# Patient Record
Sex: MALE
[De-identification: names, ages, dates, MRNs, and addresses within clinical notes are randomized per-mention and may not be internally consistent; named-entity substitution may affect disease eponyms.]

---

## 2018-10-11 ENCOUNTER — HOSPITAL ENCOUNTER (EMERGENCY)
Dept: HOSPITAL 5 - ED | Age: 58
Discharge: OTHER INSTITUTION | End: 2018-10-11
Payer: COMMERCIAL

## 2018-10-11 VITALS — DIASTOLIC BLOOD PRESSURE: 84 MMHG | SYSTOLIC BLOOD PRESSURE: 128 MMHG

## 2018-10-11 DIAGNOSIS — I21.A1: ICD-10-CM

## 2018-10-11 DIAGNOSIS — I10: ICD-10-CM

## 2018-10-11 DIAGNOSIS — R07.89: Primary | ICD-10-CM

## 2018-10-11 DIAGNOSIS — N28.9: ICD-10-CM

## 2018-10-11 DIAGNOSIS — Z87.891: ICD-10-CM

## 2018-10-11 DIAGNOSIS — N17.9: ICD-10-CM

## 2018-10-11 DIAGNOSIS — R06.02: ICD-10-CM

## 2018-10-11 DIAGNOSIS — I48.91: ICD-10-CM

## 2018-10-11 DIAGNOSIS — Z91.09: ICD-10-CM

## 2018-10-11 LAB
APTT BLD: 29.2 SEC. (ref 24.2–36.6)
BASOPHILS # (AUTO): 0.1 K/MM3 (ref 0–0.1)
BASOPHILS NFR BLD AUTO: 0.8 % (ref 0–1.8)
BUN SERPL-MCNC: 41 MG/DL (ref 9–20)
BUN/CREAT SERPL: 13 %
CALCIUM SERPL-MCNC: 7.9 MG/DL (ref 8.4–10.2)
EOSINOPHIL # BLD AUTO: 0.2 K/MM3 (ref 0–0.4)
EOSINOPHIL NFR BLD AUTO: 2.8 % (ref 0–4.3)
HCT VFR BLD CALC: 31.7 % (ref 35.5–45.6)
HDLC SERPL-MCNC: 41 MG/DL (ref 40–59)
HEMOLYSIS INDEX: 9
HGB BLD-MCNC: 10.4 GM/DL (ref 11.8–15.2)
INR PPP: 1.05 (ref 0.87–1.13)
LYMPHOCYTES # BLD AUTO: 1.4 K/MM3 (ref 1.2–5.4)
LYMPHOCYTES NFR BLD AUTO: 19.4 % (ref 13.4–35)
MCH RBC QN AUTO: 34 PG (ref 28–32)
MCHC RBC AUTO-ENTMCNC: 33 % (ref 32–34)
MCV RBC AUTO: 103 FL (ref 84–94)
MONOCYTES # (AUTO): 0.8 K/MM3 (ref 0–0.8)
MONOCYTES % (AUTO): 10.3 % (ref 0–7.3)
PLATELET # BLD: 302 K/MM3 (ref 140–440)
RBC # BLD AUTO: 3.09 M/MM3 (ref 3.65–5.03)

## 2018-10-11 PROCEDURE — 85730 THROMBOPLASTIN TIME PARTIAL: CPT

## 2018-10-11 PROCEDURE — 82553 CREATINE MB FRACTION: CPT

## 2018-10-11 PROCEDURE — 80061 LIPID PANEL: CPT

## 2018-10-11 PROCEDURE — 99291 CRITICAL CARE FIRST HOUR: CPT

## 2018-10-11 PROCEDURE — 93005 ELECTROCARDIOGRAM TRACING: CPT

## 2018-10-11 PROCEDURE — A9558 XE133 XENON 10MCI: HCPCS

## 2018-10-11 PROCEDURE — A9540 TC99M MAA: HCPCS

## 2018-10-11 PROCEDURE — 85379 FIBRIN DEGRADATION QUANT: CPT

## 2018-10-11 PROCEDURE — 78582 LUNG VENTILAT&PERFUS IMAGING: CPT

## 2018-10-11 PROCEDURE — 96374 THER/PROPH/DIAG INJ IV PUSH: CPT

## 2018-10-11 PROCEDURE — 36415 COLL VENOUS BLD VENIPUNCTURE: CPT

## 2018-10-11 PROCEDURE — 80048 BASIC METABOLIC PNL TOTAL CA: CPT

## 2018-10-11 PROCEDURE — 85025 COMPLETE CBC W/AUTO DIFF WBC: CPT

## 2018-10-11 PROCEDURE — 85610 PROTHROMBIN TIME: CPT

## 2018-10-11 PROCEDURE — 93010 ELECTROCARDIOGRAM REPORT: CPT

## 2018-10-11 PROCEDURE — 84484 ASSAY OF TROPONIN QUANT: CPT

## 2018-10-11 PROCEDURE — 82550 ASSAY OF CK (CPK): CPT

## 2018-10-11 PROCEDURE — 83880 ASSAY OF NATRIURETIC PEPTIDE: CPT

## 2018-10-11 PROCEDURE — 71045 X-RAY EXAM CHEST 1 VIEW: CPT

## 2018-10-11 NOTE — NUCLEAR MEDICINE REPORT
VENTILATION/PERFUSION LUNG SCAN: 10/11/18 



CLINICAL: Chest pain and shortness of breath.



TECHNIQUE: 15.0 mCi of xenon-133 was administered by aerosol and 5.0 

mCi of technetium 99m MAA was administered intravenously.  No chest 

x-ray for comparison.



FINDINGS:    



   

Inhalation of Xenon gas demonstrates a normal distribution of the  

activity throughout both lungs.  However, wash out phases show 

significant retention of activity in the left upper lobe and left lower 

lobe.



After injection of Technetium 99m macroaggregated albumin gamma  

camera imaging of the lungs in multiple projections demonstrates  

normal pulmonary contours with a relatively homogeneous distribution of 

activity in the right lung.  Heterogeneous perfusion in the left lung 

matches areas of air trapping on the ventilation study.  



IMPRESSION:  

Low probability for pulmonary embolus.

## 2018-10-11 NOTE — EMERGENCY DEPARTMENT REPORT
ED Chest Pain HPI





- General


Chief Complaint: Chest Pain


Stated Complaint: CHEST PAIN


Time Seen by Provider: 10/11/18 10:20


Source: patient, EMS


Mode of arrival: Stretcher


Limitations: Physical Limitation





- History of Present Illness


Initial Comments: 


Patient is a 58-year-old male sensed emergency room via EMS for chest pain and 

shortness of breath and palpitations.  Patient states that his chest pain 

palpitations and shortness of breath have resolved after vagal maneuver done by 

EMS.  Per EMS patient was diaphoretic and having dyspnea and on EKG was found 

to have a tachycardic, SVT rhythm and on scene EMS performed a vagal maneuver 

and the patient's rhythm converted.  Patient states after the vagal maneuver 

all symptoms resolved.  Patient denies fever and chills patient denies 

abdominal pain.  Patient denies nausea vomiting.  Patient states he does have a 

history of A. fib but it has been controlled for a long time.


MD Complaint: chest pain


-: Sudden


Onset: during rest


Pain Location: substernal, left chest


Pain Radiation: LUE


Severity: severe


Severity scale (0 -10): 6


Quality: heaviness


Consistency: now resolved


Improves With: rest, other (EMS intervention)


Worsens With: exertion


re: diaphoresis, dyspnea.  denies: nausea, vomting, sense of impending doom


Other Symptoms: palpitations.  denies: cough, fever, syncope, rash, acid taste 

in mouth, leg swelling, burping


Treatments Prior to Arrival: other (EKG and vagal maneuver)


Aspirin use within the Past 7 Days: (1) Yes





- Related Data


On Oral Contraceptives: No


 Allergies











Allergy/AdvReac Type Severity Reaction Status Date / Time


 


iron Allergy  Nausea Verified 10/11/18 10:37














Heart Score





- HEART Score


History: Moderately suspicious


EKG: Non-specific


Age: 45-65


Risk factors: 1-2 risk factors


Troponin: > 3x normal limit


HEART Score: 6





ED Review of Systems


ROS: 


Stated complaint: CHEST PAIN


Other details as noted in HPI





Constitutional: diaphoresis.  denies: chills, fever


Eyes: denies: eye pain, eye discharge, vision change


ENT: denies: ear pain, throat pain


Respiratory: shortness of breath.  denies: cough, wheezing


Cardiovascular: chest pain, palpitations


Endocrine: no symptoms reported


Gastrointestinal: denies: abdominal pain, nausea, diarrhea


Genitourinary: denies: urgency, dysuria


Musculoskeletal: denies: back pain, joint swelling, arthralgia


Skin: denies: rash, lesions


Neurological: denies: headache, weakness, paresthesias


Psychiatric: denies: anxiety, depression


Hematological/Lymphatic: denies: easy bleeding, easy bruising





ED Past Medical Hx





- Past Medical History


Previous Medical History?: Yes


Hx Hypertension: Yes





- Social History


Smoking Status: Former Smoker


Substance Use Type: None





ED Physical Exam





- General


Limitations: Physical Limitation


General appearance: alert, in no apparent distress





- Head


Head exam: Present: atraumatic, normocephalic





- Eye


Eye exam: Present: normal appearance





- ENT


ENT exam: Present: mucous membranes moist





- Neck


Neck exam: Present: normal inspection





- Respiratory


Respiratory exam: Present: normal lung sounds bilaterally.  Absent: respiratory 

distress





- Cardiovascular


Cardiovascular Exam: Present: regular rate, normal rhythm.  Absent: systolic 

murmur, diastolic murmur, rubs, gallop





- GI/Abdominal


GI/Abdominal exam: Present: soft, normal bowel sounds





- Rectal


Rectal exam: Present: deferred





- Extremities Exam


Extremities exam: Present: normal inspection





- Back Exam


Back exam: Present: normal inspection





- Neurological Exam


Neurological exam: Present: alert, oriented X3





- Psychiatric


Psychiatric exam: Present: normal affect, normal mood





- Skin


Skin exam: Present: warm, dry, intact, normal color.  Absent: rash





ED Course


 Vital Signs











  10/11/18 10/11/18 10/11/18





  10:27 10:33 10:45


 


Temperature 97.6 F  


 


Pulse Rate 77  103 H


 


Respiratory 18 10 L 11 L





Rate   


 


Blood Pressure 130/85  136/89


 


O2 Sat by Pulse 99  73 L





Oximetry   














  10/11/18 10/11/18 10/11/18





  11:00 11:01 11:15


 


Temperature   


 


Pulse Rate 100 H  99 H


 


Respiratory 14 18 12





Rate   


 


Blood Pressure 140/88  142/96


 


O2 Sat by Pulse 98 99 99





Oximetry   














  10/11/18 10/11/18 10/11/18





  11:30 11:45 12:00


 


Temperature   


 


Pulse Rate 97 H 73 70


 


Respiratory 19 14 15





Rate   


 


Blood Pressure 142/96 140/86 141/81


 


O2 Sat by Pulse 100 80 L 75 L





Oximetry   














  10/11/18 10/11/18 10/11/18





  12:15 12:30 12:45


 


Temperature   


 


Pulse Rate 69 71 71


 


Respiratory 13 13 14





Rate   


 


Blood Pressure 140/82 141/82 132/80


 


O2 Sat by Pulse 98 100 98





Oximetry   














  10/11/18 10/11/18 10/11/18





  13:00 13:16 13:30


 


Temperature   


 


Pulse Rate 94 H 68 69


 


Respiratory 17 18 12





Rate   


 


Blood Pressure 141/100 143/92 135/84


 


O2 Sat by Pulse 97 97 99





Oximetry   














  10/11/18 10/11/18 10/11/18





  13:45 14:00 14:15


 


Temperature   


 


Pulse Rate 68 71 


 


Respiratory 13 14 





Rate   


 


Blood Pressure 136/77 136/81 138/80


 


O2 Sat by Pulse 99 97 96





Oximetry   














  10/11/18





  18:28


 


Temperature 


 


Pulse Rate 109 H


 


Respiratory 





Rate 


 


Blood Pressure 128/84


 


O2 Sat by Pulse 92





Oximetry 














- Reevaluation(s)


Reevaluation #1: 








Discuss results with patient.  Patient's troponin is elevated most likely 

secondary to A. fib with RVR.  Patient's heart rate has been controlled on the 

ER.  Patient's vital signs are stable.  Patient also has elevated d-dimer.  

Patient is in acute renal failure and will need a nuclear scan to rule out PE


10/11/18 12:36





Reevaluation #2: 


Discussed all results with patient.  Discussed plan of care and admission with 

patient.  Patient refused admission and wants to sign out AMA.  Patient states 

he wants to see his cardiologist as an outpatient.  I encouraged patient to be 

admitted here and explained all the risk of leaving the hospital AGAINST 

MEDICAL ADVICE.  Patient voiced understanding of risks.  .  Nurse at bedside 

for entire conversation.


10/11/18 15:33








After return to the room to have the patient sign an AMA form, patient states 

she would rather be transferred to [facility] then leave AGAINST 

MEDICAL ADVICE.  I will consult with [facility] ER to see if they 

will accept.


10/11/18 15:45








Patient's repeat troponin came back elevated again, will place patient on 

heparin drip.  Receiving physician at [facility] has been made aware 

of changes and new treatment plan.  Due to the fact that the patient's troponin 

is elevating, elevation could be secondary to renal or could be an N STEMI, but 

etiology unclear so we will go ahead and start heparin drip.


10/11/18 17:34








Patient started on heparin drip.  Patient is being drip via EMS to [facility].  Patient is stable to time.  EMS will transfer patient


10/11/18 18:47








- Consultations


Consultation #1: 


[facility] attending Dr. NORA Anglin  has accepted the patient as a direct 

admit.  Patient will be transferred via EMS to [facility]








10/11/18 16:25








CHAITANYA score





- Chaitanya Score


Age > 65: (0) No


Aspirin use within the Past 7 Days: (1) Yes


3 or more CAD Risk Factors: (1) Yes


2 or more Angina events in past 24 hrs: (0) No


Known CAD with more than 50% Stenosis: (0) No


Elevated Cardiac Markers: (0) No


ST Deviation Greater than 0.5mm: (0) No


CHAITANYA Score: 2





ED Medical Decision Making





- Lab Data


Result diagrams: 


 10/11/18 10:53





 10/11/18 10:53





- EKG Data


-: EKG Interpreted by Me


EKG shows normal: axis, intervals, QRS complexes, ST-T waves


Rate: normal





- EKG Data


Interpretation: other (afib)





- Radiology Data


Radiology results: report reviewed





 FINDINGS: 








 Inhalation of Xenon gas demonstrates a normal distribution of the 


 activity throughout both lungs. However, wash out phases show 


 significant retention of activity in the left upper lobe and left lower 


 lobe. 





 After injection of Technetium 99m macroaggregated albumin gamma 


 camera imaging of the lungs in multiple projections demonstrates 


 normal pulmonary contours with a relatively homogeneous distribution of 


 activity in the right lung. Heterogeneous perfusion in the left lung 


 matches areas of air trapping on the ventilation study. 





 IMPRESSION: 


 Low probability for pulmonary embolus. 





 Transcribed By: [facility] 


 Dictated By: DAVIDE PAYNE MD 


 Electronically Authenticated By: DAVIDE PAYNE MD 


 Signed Date/Time: 10/11/18 1519 














FINAL REPORT 





 EXAM: XR CHEST 1V AP 





 HISTORY: chest pain 





 TECHNIQUE: AP portable view of the chest 





 PRIORS: None. 





 FINDINGS: 


 Lines, tubes, and devices: N/A 





 Lungs and pleura: Trachea is normal in position. Lungs are clear 


 of infiltrate, pleural effusion, vascular congestion, or 


 pneumothorax. 





 Cardiomediastinal silhouette: Cardiac and mediastinal silhouettes 


 are unremarkable. 





 Other: Bony structures are intact. 





 IMPRESSION: 


 No acute cardiopulmonary process seen. 











 Transcribed By: [facility] 


 Dictated By: MIKE BUTLER MD 


 Electronically Authenticated By: MIKE BUTLER MD 


 Signed Date/Time: 10/11/18 8801 

















- Medical Decision Making





58-year-old gentleman that presented to the emergency room with chest pain 

shortness breath palpitations and A. fib RVR.  Patient was converted by EMS 

prior to arrival with Valsalva maneuver.  Patient's troponin has slowly been 

elevating.  Patient had an elevated d-dimer and BMP. Patient had a nuclear scan 

done of his lungs which low probability for PE. Patient initially wanted to 

sign out AMA however patient decided to request a transfer to [facility] where his cardiologist is.  [facility] has accepted the 

patient.   will be transferred via EMS to [facility]





Due to the fact that the patient's troponin continues to elevate, patient was 

started on a heparin drip for possible N STEMI





- Differential Diagnosis


chest pain.  Shortness of breath.  A. fib RVR.  Dehydration.  ACS. pe


Critical Care Time: Yes


Critical care attestation.: 


If time is entered above; I have spent that time in minutes in the direct care 

of this critically ill patient, excluding procedure time.





Critical Care Time: 


50 minutes 








ED Disposition


Clinical Impression: 


 SOB (shortness of breath), Atrial fibrillation with RVR, Elevated troponin, 

Renal insufficiency, Elevated d-dimer, Diaphoresis, NSTEMI (non-ST elevated 

myocardial infarction)





Chest pain


Qualifiers:


 Chest pain type: unspecified Qualified Code(s): R07.9 - Chest pain, unspecified





Acute-on-chronic renal failure


Qualifiers:


 Acute renal failure type: unspecified Chronic kidney disease stage: 

unspecified stage Qualified Code(s): N17.9 - Acute kidney failure, unspecified





Disposition: DC/TX-70 ANOTHER TYPE HLTHCARE


Is pt being admited?: No


Does the pt Need Aspirin: No


Condition: Critical


Time of Disposition: 16:25

## 2018-10-11 NOTE — XRAY REPORT
FINAL REPORT



EXAM:  XR CHEST 1V AP



HISTORY:  chest pain 



TECHNIQUE:  AP portable view of the chest



PRIORS:  None.



FINDINGS:  

Lines, tubes, and devices:  N/A



Lungs and pleura: Trachea is normal in position. Lungs are clear

of infiltrate, pleural effusion, vascular congestion, or

pneumothorax. 



Cardiomediastinal silhouette: Cardiac and mediastinal silhouettes

are unremarkable.



Other: Bony structures are intact.



IMPRESSION:  

No acute cardiopulmonary process seen.